# Patient Record
(demographics unavailable — no encounter records)

---

## 2024-10-29 NOTE — HISTORY OF PRESENT ILLNESS
[FreeTextEntry1] : 32 yo  who presents for Paragard IUD removal. She has tried the IUD for 6 months and continues to have consistent pelvic pain, increased dysmenorrhea. She was previously counseled on all methods, has done research, and desires hormonal IUD in 1 month. She declines discussing other methods and declines information sheet.

## 2024-10-29 NOTE — PROCEDURE
[IUD Removal] : intrauterine device (IUD) removal [Dysmenorrhea] : dysmenorrhea [Risks] : risks [Benefits] : benefits [Alternatives] : alternatives [Patient] : patient [Speculum Placed] : speculum placed [IUD Removed - Forceps] : IUD removed - forceps [Tolerated Well] : Patient tolerated the procedure well [No Complications] : no complications [___ Month(s)] : in [unfilled] month(s) [FreeTextEntry6] : for hormonal IUD insertion

## 2024-10-29 NOTE — PLAN
Dr. Jorge
glenroy
[FreeTextEntry1] : 34 yo  who presents for Paragard removal due to side effects.  1. S/p Paragard removal - Uncomplicated removal strings grasped with forceps, IUD intact - Aware of rapid return to fertility - Desires hormonal IUD. Will return to office in 1 month for placement

## 2024-10-29 NOTE — PHYSICAL EXAM
[Appropriately responsive] : appropriately responsive [Oriented x3] : oriented x3 [Labia Majora] : normal [Labia Minora] : normal [Normal] : normal [IUD String] : an IUD string was noted [Chaperone Present] : A chaperone was present in the examining room during all aspects of the physical examination [FreeTextEntry2] : NSEDA LPN

## 2024-12-23 NOTE — PROCEDURE
[Transvaginal OB Sonogram] : Transvaginal OB Sonogram [Intrauterine Pregnancy] : intrauterine pregnancy [Yolk Sac] : yolk sac present [Transvaginal OB Sonogram WNL] : Transvaginal OB Sonogram WNL [Fetal Heart] : no fetal heart [FreeTextEntry1] : GS measuring 6w1d no fetal pole. EDC by LMP 8/8/25

## 2024-12-23 NOTE — HISTORY OF PRESENT ILLNESS
[FreeTextEntry1] : 34 yo  at 6w3d by LMP 24 presenting requesting RAJEEV for induced .  Mikayla was seen at [facility] ER for pain on .  I have independently reviewed and interpreted ultrasound performed on25 and patient was found to have a pregnancy of unknown location. HCG f/u on  was 2369.  Discussed significance of pul and if still pul will follow with blood work.   Risks of RAJEEV includin. Infection: Patient was counseled on risk of infection and the use of prophylactic antibiotics, signs/symptoms of pre- and post-operative infection were reviewed. 2. Hemorrhage: Patient was counseled on the risk of hemorrhage, possibly requiring blood (and/or blood products) transfusion, management including use of but not limited to uterotonic medications. PT HAS NO OBJECTIONS TO BLOOD TRANSFUSION OR RECEIVING BLOOD PRODUCTS. 3. Injury/Perforation: Risk of injury to vagina, cervix, uterus reviewed. Patient was counseled on the risk of uterine perforation with/without need for laparoscopy/laparotomy with/without injury to adjacent organs such as bowel/bladder. 4.  Risk of retained products of conception with/without need for medication or suction procedure to empty the uterus.  The patient also understands it is their responsibility to bring to the attention of their physician any unusual symptoms following the  and to report to follow-up examinations.  They are sure of their decision and deny any coercion from family, friends or healthcare providers. The patient had the opportunity to ask questions and all questions were answered.  Mikayla is interested in starting OCPs. No contraindications.

## 2024-12-23 NOTE — PHYSICAL EXAM
[Chaperone Present] : A chaperone was present in the examining room during all aspects of the physical examination [Appropriately responsive] : appropriately responsive [Alert] : alert [No Acute Distress] : no acute distress [Oriented x3] : oriented x3 [FreeTextEntry2] : VIOLETTA STEPHENS MA

## 2024-12-23 NOTE — PLAN
[FreeTextEntry1] : 34 yo s/p repeat office RAJEEV for ongoing IUP. Pt was okay with the situation and happy to have this completed prior to winter break for her children. I reassured Mikayla and her  that I am certain of completion by TVUS and tissue inspection while she was still in the office.  I sent fluconazole for presumed yeast infection and will f/u vaginitis swab.

## 2024-12-23 NOTE — PHYSICAL EXAM
[Chaperone Present] : A chaperone was present in the examining room during all aspects of the physical examination [FreeTextEntry2] : NSEDA LPN [Appropriately responsive] : appropriately responsive [Alert] : alert [No Acute Distress] : no acute distress [Oriented x3] : oriented x3 [Labia Majora] : normal [Labia Minora] : normal [Discharge] : a  ~M vaginal discharge was present [Moderate] : moderate [Foul Smelling] : not foul smelling [Homer] : yellow [Normal] : normal [Uterine Adnexae] : normal

## 2024-12-23 NOTE — PROCEDURE
[Transvaginal OB Sonogram] : Transvaginal OB Sonogram [Intrauterine Pregnancy] : intrauterine pregnancy [Yolk Sac] : yolk sac present [Fetal Heart] : no fetal heart [FreeTextEntry1] : + ongoing IUP

## 2024-12-23 NOTE — PLAN
[FreeTextEntry1] :  34yo  at 6w1d by LMP now s/p RAJEEV for induced   1. s/p office RAJEEV - All available medical records reviewed - All consents signed today, all questions/concerns addressed - pt does not desire medical management - Patient offered pamphlet for support services   2. ID/Neuro - GC/CT not required - doxycycline 200 mg Rx sent to pharmacy - Reviewed Tylenol 975mg -1000mg q6 hours - Ibuprofen 600 mg po q 6 prn- Rx sent to pharmacy  3. Labs/Blood type - No hx of anemia - RH testing not indicated  4. Contraception/Conception - Patient counseled on all contraceptive options - Patient wants ocps, quick start reviewed- no contraindications.  5. Post-op - Post-operative follow-up phone call virtual visit to be scheduled in 2 weeks - pre- and Post-operative instruction sheet given, reviewed bleeding and infection precautions - Provided 24 hour contact phone number - All questions/concerns of patient addressed to their satisfaction   I spent a total of 20 minutes on the encounter evaluating and treating the patient.  Total time does not include the time spent on separately billable procedures performed on this DOS.

## 2024-12-23 NOTE — HISTORY OF PRESENT ILLNESS
[FreeTextEntry1] : 32 yo  at 6w3d by LMP 24 presenting requesting RAJEEV for induced .  Mikayla was seen at [facility] ER for pain on .  I have independently reviewed and interpreted ultrasound performed on25 and patient was found to have a pregnancy of unknown location. HCG f/u on  was 2369.  Discussed significance of pul and if still pul will follow with blood work.   Risks of RAJEEV includin. Infection: Patient was counseled on risk of infection and the use of prophylactic antibiotics, signs/symptoms of pre- and post-operative infection were reviewed. 2. Hemorrhage: Patient was counseled on the risk of hemorrhage, possibly requiring blood (and/or blood products) transfusion, management including use of but not limited to uterotonic medications. PT HAS NO OBJECTIONS TO BLOOD TRANSFUSION OR RECEIVING BLOOD PRODUCTS. 3. Injury/Perforation: Risk of injury to vagina, cervix, uterus reviewed. Patient was counseled on the risk of uterine perforation with/without need for laparoscopy/laparotomy with/without injury to adjacent organs such as bowel/bladder. 4.  Risk of retained products of conception with/without need for medication or suction procedure to empty the uterus.  The patient also understands it is their responsibility to bring to the attention of their physician any unusual symptoms following the  and to report to follow-up examinations.  They are sure of their decision and deny any coercion from family, friends or healthcare providers. The patient had the opportunity to ask questions and all questions were answered.  Mikayla is interested in starting OCPs. No contraindications.

## 2024-12-23 NOTE — HISTORY OF PRESENT ILLNESS
[FreeTextEntry1] : 32 yo s/p office aspiration 1 week ago called re: continuing pregnancy. Mikayla was at her obgyn's office for her annual and sonogram reveals IUP. I looked back at the sonogram pictures and given retroverted uterus it  is possible that there is a circular gs at fundus. Pt told to come to office ASAP. Consent from last week is valid, she would like repeat RAJEEV. Aware of risks of bleeding, infection, retained tissue and injury. Pt also complains of vaginal itching and discharge.